# Patient Record
Sex: FEMALE | Race: BLACK OR AFRICAN AMERICAN | Employment: UNEMPLOYED | URBAN - METROPOLITAN AREA
[De-identification: names, ages, dates, MRNs, and addresses within clinical notes are randomized per-mention and may not be internally consistent; named-entity substitution may affect disease eponyms.]

---

## 2021-01-17 ENCOUNTER — HOSPITAL ENCOUNTER (EMERGENCY)
Age: 41
Discharge: HOME | End: 2021-01-17
Attending: STUDENT IN AN ORGANIZED HEALTH CARE EDUCATION/TRAINING PROGRAM
Payer: MEDICAID

## 2021-01-17 VITALS
OXYGEN SATURATION: 98 % | WEIGHT: 205.69 LBS | RESPIRATION RATE: 16 BRPM | HEART RATE: 87 BPM | HEIGHT: 65 IN | TEMPERATURE: 98 F | DIASTOLIC BLOOD PRESSURE: 59 MMHG | SYSTOLIC BLOOD PRESSURE: 129 MMHG | BODY MASS INDEX: 34.27 KG/M2

## 2021-01-17 DIAGNOSIS — R59.1 LYMPHADENOPATHY OF HEAD AND NECK: ICD-10-CM

## 2021-01-17 DIAGNOSIS — H66.002 NON-RECURRENT ACUTE SUPPURATIVE OTITIS MEDIA OF LEFT EAR WITHOUT SPONTANEOUS RUPTURE OF TYMPANIC MEMBRANE: ICD-10-CM

## 2021-01-17 DIAGNOSIS — J01.00 ACUTE NON-RECURRENT MAXILLARY SINUSITIS: Primary | ICD-10-CM

## 2021-01-17 PROCEDURE — 99282 EMERGENCY DEPT VISIT SF MDM: CPT

## 2021-01-17 RX ORDER — AMOXICILLIN AND CLAVULANATE POTASSIUM 875; 125 MG/1; MG/1
1 TABLET, FILM COATED ORAL 2 TIMES DAILY
Qty: 20 TAB | Refills: 0 | Status: SHIPPED | OUTPATIENT
Start: 2021-01-17

## 2021-01-18 NOTE — ED PROVIDER NOTES
EMERGENCY DEPARTMENT HISTORY AND PHYSICAL EXAM      Date: 1/17/2021  Patient Name: Gilford Meth    History of Presenting Illness     Chief Complaint   Patient presents with    Neck Pain     pt feels that she has a swollen lymph node near her left ear that has been bothering her for more than a week. She states that her left neck hurts too. Denies fever       History Provided By: Patient    HPI: Gilford Meth, 45 y.o. female with no significant past medical history, presents to the ED with cc of swelling next to her left ear for 1 week. The patient reports that she had a sinus infection last summer and has had intermittent sinus pressure and pain since then. Over the last week, she has developed an area of painful swelling anterior to her left tragus. She is also noted some mild ear pain and tenderness over the left lateral neck. She is taken Tylenol with minimal relief of pain. Of note, she is currently pregnant in her second trimester. She denies fever, drainage from the ear, pain with eating. There are no other complaints, changes, or physical findings at this time. PCP: Yina Cobb MD    No current facility-administered medications on file prior to encounter. Current Outpatient Medications on File Prior to Encounter   Medication Sig Dispense Refill    [DISCONTINUED] sodium chloride (SALINE NASAL MIST) 0.65 % nasal spray 1 spray by Right Nostril route as needed for Congestion. 15 mL 0    [DISCONTINUED] traMADol (ULTRAM) 50 mg tablet Take 1 tablet by mouth every six (6) hours as needed for Pain. 20 tablet 0    [DISCONTINUED] amoxicillin 500 mg tab Take 500 mg by mouth three (3) times daily. 30 tablet 0    [DISCONTINUED] hydrOXYzine (VISTARIL) 25 mg capsule Take 1 capsule by mouth three (3) times daily as needed for Itching or Anxiety.  12 capsule 0       Past History     Past Medical History:  Past Medical History:   Diagnosis Date    Goiter     Other ill-defined conditions(309.89)     please note that pt states NO MEDICAL HISTORY; charted history was written in error on wrong patient    Urinary tract infection        Past Surgical History:  History reviewed. No pertinent surgical history. Family History:  History reviewed. No pertinent family history. Social History:  Social History     Tobacco Use    Smoking status: Current Every Day Smoker     Packs/day: 0.50    Smokeless tobacco: Never Used   Substance Use Topics    Alcohol use: No    Drug use: No       Allergies: Allergies   Allergen Reactions    Ibuprofen Itching and Swelling    Ibuprofen Swelling         Review of Systems   Review of Systems   Constitutional: Negative for chills and fever. HENT: Positive for congestion, ear pain, sinus pressure and sinus pain. Negative for sore throat. Eyes: Negative for redness and visual disturbance. Respiratory: Negative for cough and shortness of breath. Cardiovascular: Negative for chest pain and palpitations. Gastrointestinal: Negative for abdominal pain, nausea and vomiting. Genitourinary: Negative for dysuria and hematuria. Musculoskeletal: Negative for back pain and gait problem. Skin: Negative for rash and wound. Neurological: Negative for dizziness and headaches. Psychiatric/Behavioral: Negative for behavioral problems and confusion. All other systems reviewed and are negative. Physical Exam   Physical Exam  Vitals signs and nursing note reviewed. Constitutional:       Appearance: She is well-developed. She is not toxic-appearing. Comments: Conversant female in no acute distress. HENT:      Head: Normocephalic and atraumatic. Ears:      Comments: There is a tender, enlarged lymph node anterior to the left tragus. There is no erythema or swelling to the tragus. No enlargement of the parotid gland. Mild erythema of the left TM but no bulging or middle ear effusion.  Mild tenderness to palpation over the left posterior cervical lymph chain, although I am unable to palpate any enlarged lymph nodes there. Nose:      Right Turbinates: Swollen. Left Turbinates: Swollen. Right Sinus: Maxillary sinus tenderness present. No frontal sinus tenderness. Left Sinus: Maxillary sinus tenderness present. No frontal sinus tenderness. Eyes:      Conjunctiva/sclera: Conjunctivae normal.      Pupils: Pupils are equal, round, and reactive to light. Neck:      Musculoskeletal: Normal range of motion and neck supple. Cardiovascular:      Rate and Rhythm: Normal rate and regular rhythm. Heart sounds: Normal heart sounds. Pulmonary:      Effort: Pulmonary effort is normal.      Breath sounds: Normal breath sounds. Abdominal:      Comments: Gravid abdomen. Musculoskeletal: Normal range of motion. Skin:     General: Skin is warm and dry. Findings: No rash. Neurological:      Mental Status: She is alert and oriented to person, place, and time. GCS: GCS eye subscore is 4. GCS verbal subscore is 5. GCS motor subscore is 6. Cranial Nerves: No cranial nerve deficit. Sensory: No sensory deficit. Psychiatric:         Behavior: Behavior normal.           Diagnostic Study Results     Labs -   No results found for this or any previous visit (from the past 12 hour(s)). Radiologic Studies -   No orders to display     CT Results  (Last 48 hours)    None        CXR Results  (Last 48 hours)    None            Medical Decision Making   I am the first provider for this patient. I reviewed the vital signs, available nursing notes, past medical history, past surgical history, family history and social history. Vital Signs-Reviewed the patient's vital signs.   Patient Vitals for the past 12 hrs:   Temp Pulse Resp BP SpO2   01/17/21 1313 98 °F (36.7 °C) 87 16 (!) 129/59 98 %         Records Reviewed: Nursing Notes and Old Medical Records      Provider Notes (Medical Decision Making):   DDx: Lymphadenitis, otitis media, otitis externa, muscle strain, sinusitis    Physical exam reveals evidence of maxillary sinusitis as well as early left otitis media. There is reactive lymphadenopathy. Plan to treat with antibiotic. Discussed follow-up and return precautions. ED Course:   Initial assessment performed. The patients presenting problems have been discussed, and they are in agreement with the care plan formulated and outlined with them. I have encouraged them to ask questions as they arise throughout their visit. Disposition:  2:17 PM  The patient has been re-evaluated and is ready for discharge. Reviewed available results with patient. Counseled patient on diagnosis and care plan. Patient has expressed understanding, and all questions have been answered. Patient agrees with plan and agrees to follow up as recommended, or to return to the ED if their symptoms worsen. Discharge instructions have been provided and explained to the patient, along with reasons to return to the ED. PLAN:  1. Discharge Medication List as of 1/17/2021  2:17 PM        2. Follow-up Information     Follow up With Specialties Details Why Contact Info    Your OB/gyn  Schedule an appointment as soon as possible for a visit in 1 week for a recheck     [facility] EMERGENCY DEPT Emergency Medicine Go to  If symptoms worsen [address] [place] [address]  [phone]        Return to ED if worse     Diagnosis     Clinical Impression:   1. Acute non-recurrent maxillary sinusitis    2. Non-recurrent acute suppurative otitis media of left ear without spontaneous rupture of tympanic membrane    3. Lymphadenopathy of head and neck            Taya Sarkar.  AMAN Rodriguez

## 2024-09-28 ENCOUNTER — HOSPITAL ENCOUNTER (EMERGENCY)
Facility: HOSPITAL | Age: 44
Discharge: HOME | End: 2024-09-28
Attending: EMERGENCY MEDICINE
Payer: MEDICAID

## 2024-09-28 VITALS
SYSTOLIC BLOOD PRESSURE: 121 MMHG | BODY MASS INDEX: 29.99 KG/M2 | HEIGHT: 65 IN | WEIGHT: 180 LBS | DIASTOLIC BLOOD PRESSURE: 73 MMHG | HEART RATE: 87 BPM | TEMPERATURE: 98.1 F | OXYGEN SATURATION: 98 % | RESPIRATION RATE: 18 BRPM

## 2024-09-28 DIAGNOSIS — B96.89 BACTERIAL VAGINOSIS: ICD-10-CM

## 2024-09-28 DIAGNOSIS — N39.0 ACUTE UTI: ICD-10-CM

## 2024-09-28 DIAGNOSIS — Z20.2 ENCOUNTER FOR ASSESSMENT OF STD EXPOSURE: ICD-10-CM

## 2024-09-28 DIAGNOSIS — N76.0 ACUTE VAGINITIS: Primary | ICD-10-CM

## 2024-09-28 DIAGNOSIS — A59.9 TRICHOMONIASIS: ICD-10-CM

## 2024-09-28 DIAGNOSIS — N76.0 BACTERIAL VAGINOSIS: ICD-10-CM

## 2024-09-28 LAB
APPEARANCE UR: ABNORMAL
BACTERIA URNS QL MICRO: ABNORMAL /HPF
BILIRUB UR QL: NEGATIVE
CLUE CELLS VAG QL WET PREP: ABNORMAL
COLOR UR: ABNORMAL
EPITH CASTS URNS QL MICRO: ABNORMAL /LPF
GLUCOSE UR STRIP.AUTO-MCNC: NEGATIVE MG/DL
HCG UR QL: NEGATIVE
HGB UR QL STRIP: NEGATIVE
KETONES UR QL STRIP.AUTO: ABNORMAL MG/DL
LEUKOCYTE ESTERASE UR QL STRIP.AUTO: ABNORMAL
NITRITE UR QL STRIP.AUTO: NEGATIVE
PH UR STRIP: 6.5 (ref 5–8)
PROT UR STRIP-MCNC: ABNORMAL MG/DL
RBC #/AREA URNS HPF: ABNORMAL /HPF (ref 0–5)
SP GR UR REFRACTOMETRY: 1.02
T VAGINALIS VAG QL WET PREP: ABNORMAL
TRICHOMONAS UR QL MICRO: PRESENT
URINE CULTURE IF INDICATED: ABNORMAL
UROBILINOGEN UR QL STRIP.AUTO: 1 EU/DL (ref 0.2–1)
WBC URNS QL MICRO: ABNORMAL /HPF (ref 0–4)
YEAST: ABNORMAL

## 2024-09-28 PROCEDURE — 87086 URINE CULTURE/COLONY COUNT: CPT

## 2024-09-28 PROCEDURE — 81001 URINALYSIS AUTO W/SCOPE: CPT

## 2024-09-28 PROCEDURE — 6360000002 HC RX W HCPCS: Performed by: EMERGENCY MEDICINE

## 2024-09-28 PROCEDURE — 87491 CHLMYD TRACH DNA AMP PROBE: CPT

## 2024-09-28 PROCEDURE — 6370000000 HC RX 637 (ALT 250 FOR IP): Performed by: EMERGENCY MEDICINE

## 2024-09-28 PROCEDURE — 87591 N.GONORRHOEAE DNA AMP PROB: CPT

## 2024-09-28 PROCEDURE — 87210 SMEAR WET MOUNT SALINE/INK: CPT

## 2024-09-28 PROCEDURE — 2500000003 HC RX 250 WO HCPCS: Performed by: EMERGENCY MEDICINE

## 2024-09-28 PROCEDURE — 96372 THER/PROPH/DIAG INJ SC/IM: CPT

## 2024-09-28 PROCEDURE — 81025 URINE PREGNANCY TEST: CPT

## 2024-09-28 PROCEDURE — 99284 EMERGENCY DEPT VISIT MOD MDM: CPT

## 2024-09-28 RX ORDER — AZITHROMYCIN 500 MG/1
1000 TABLET, FILM COATED ORAL
Status: COMPLETED | OUTPATIENT
Start: 2024-09-28 | End: 2024-09-28

## 2024-09-28 RX ORDER — METRONIDAZOLE 500 MG/1
2000 TABLET ORAL
Status: COMPLETED | OUTPATIENT
Start: 2024-09-28 | End: 2024-09-28

## 2024-09-28 RX ORDER — METRONIDAZOLE 500 MG/1
500 TABLET ORAL 2 TIMES DAILY
Qty: 14 TABLET | Refills: 0 | Status: SHIPPED | OUTPATIENT
Start: 2024-09-28 | End: 2024-10-05

## 2024-09-28 RX ORDER — CEPHALEXIN 500 MG/1
500 CAPSULE ORAL 3 TIMES DAILY
Qty: 21 CAPSULE | Refills: 0 | Status: SHIPPED | OUTPATIENT
Start: 2024-09-28 | End: 2024-10-05

## 2024-09-28 RX ADMIN — LIDOCAINE HYDROCHLORIDE 500 MG: 10 INJECTION, SOLUTION EPIDURAL; INFILTRATION; INTRACAUDAL; PERINEURAL at 01:24

## 2024-09-28 RX ADMIN — METRONIDAZOLE 2000 MG: 500 TABLET ORAL at 01:34

## 2024-09-28 RX ADMIN — AZITHROMYCIN 1000 MG: 500 TABLET, FILM COATED ORAL at 01:24

## 2024-09-28 ASSESSMENT — ENCOUNTER SYMPTOMS
RHINORRHEA: 0
SHORTNESS OF BREATH: 0
SORE THROAT: 0
ABDOMINAL PAIN: 0
DIARRHEA: 0
EYE PAIN: 0
VOMITING: 0
COUGH: 0
NAUSEA: 0

## 2024-09-28 ASSESSMENT — PAIN SCALES - GENERAL: PAINLEVEL_OUTOF10: 0

## 2024-09-28 NOTE — ED NOTES
Discharge instructions were given to the patient by Barbara DE PAZ.     The patient left the Emergency Department alert and oriented and in no acute distress with 2 prescriptions. The patient was encouraged to call or return to the ED for worsening issues or problems and was encouraged to schedule a follow up appointment for continuing care.     Ambulation assessment completed before discharge.  Pt left Emergency Department ambulating at baseline with no ortho devices  Ortho device education: none    The patient verbalized understanding of discharge instructions and prescriptions, all questions were answered. The patient has no further concerns at this time.

## 2024-09-28 NOTE — ED PROVIDER NOTES
EMERGENCY DEPARTMENT HISTORY AND PHYSICAL EXAM            Please note that this dictation was completed with the assistance of \"Dragon\", the computer voice recognition software. Quite often unanticipated grammatical, syntax, homophones, and other interpretive errors are inadvertently transcribed by the computer software. Please disregard these errors and any errors that have escaped final proofreading. Thank you.    Date of Evaluation: 09/28/24  Patient: Michelle Sims  Patient Age and Sex: 44 y.o. female   MRN: [id number]  CSN: [id number]  PCP: Fior uGtierrez MD    History of Present Illness     Chief Complaint   Patient presents with    Vaginal Discharge     History Provided By: Patient/family/EMS (if available)    History is limited by: Nothing     HPI: Michelle Sims, 44 y.o. female with past medical history as documented below presents to the ED with c/o of 2-day history of mild to moderate vaginal discharge.  Patient reports concern for STI.  Denies any pelvic pain, no nausea or vomiting.  Denies any medications prior to arrival.. Pt denies any other exacerbating or ameliorating factors. There are no other complaints, changes or physical findings pertinent to the HPI at this time.    Nursing notes were all reviewed and agreed with or any disagreements were addressed in the HPI.    Past History   Past Medical History:  Past Medical History:   Diagnosis Date    Goiter     Other ill-defined conditions(015.80)     please note that pt states NO MEDICAL HISTORY; charted history was written in error on wrong patient    Urinary tract infection        Past Surgical History:  History reviewed. No pertinent surgical history.    Family History:   Family history reviewed and was non-contributory, unless specified below:  History reviewed. No pertinent family history.    Social History:  Social History     Tobacco Use    Smoking status: Every Day     Current packs/day: 0.50     Types: Cigarettes

## 2024-09-28 NOTE — ED TRIAGE NOTES
Triage: Pt arrives ambulatory from home with CC of vaginal discharge. She reports the discharge is white. She reports concern for either STD or BV.

## 2024-09-29 LAB
BACTERIA SPEC CULT: NORMAL
SERVICE CMNT-IMP: NORMAL

## 2024-09-30 LAB
C TRACH DNA SPEC QL NAA+PROBE: NEGATIVE
N GONORRHOEA DNA SPEC QL NAA+PROBE: NEGATIVE
SAMPLE TYPE: NORMAL
SERVICE CMNT-IMP: NORMAL
SPECIMEN SOURCE: NORMAL

## 2024-12-13 ENCOUNTER — HOSPITAL ENCOUNTER (EMERGENCY)
Facility: HOSPITAL | Age: 44
Discharge: HOME | End: 2024-12-13
Attending: STUDENT IN AN ORGANIZED HEALTH CARE EDUCATION/TRAINING PROGRAM
Payer: MEDICAID

## 2024-12-13 VITALS
TEMPERATURE: 97.3 F | BODY MASS INDEX: 30.24 KG/M2 | WEIGHT: 181.5 LBS | HEIGHT: 65 IN | HEART RATE: 84 BPM | SYSTOLIC BLOOD PRESSURE: 116 MMHG | DIASTOLIC BLOOD PRESSURE: 57 MMHG | OXYGEN SATURATION: 100 % | RESPIRATION RATE: 16 BRPM

## 2024-12-13 DIAGNOSIS — A59.9 TRICHOMONAS INFECTION: Primary | ICD-10-CM

## 2024-12-13 DIAGNOSIS — Z20.2 EXPOSURE TO STD: ICD-10-CM

## 2024-12-13 LAB
APPEARANCE UR: ABNORMAL
BACTERIA URNS QL MICRO: NEGATIVE /HPF
BILIRUB UR QL: NEGATIVE
C TRACH DNA SPEC QL NAA+PROBE: NEGATIVE
CLUE CELLS VAG QL WET PREP: ABNORMAL
COLOR UR: ABNORMAL
EPITH CASTS URNS QL MICRO: ABNORMAL /LPF
GLUCOSE UR STRIP.AUTO-MCNC: NEGATIVE MG/DL
HCG UR QL: NEGATIVE
HGB UR QL STRIP: NEGATIVE
KETONES UR QL STRIP.AUTO: NEGATIVE MG/DL
LEUKOCYTE ESTERASE UR QL STRIP.AUTO: ABNORMAL
N GONORRHOEA DNA SPEC QL NAA+PROBE: NEGATIVE
NITRITE UR QL STRIP.AUTO: NEGATIVE
PH UR STRIP: 5.5 (ref 5–8)
PROT UR STRIP-MCNC: ABNORMAL MG/DL
RBC #/AREA URNS HPF: ABNORMAL /HPF (ref 0–5)
SAMPLE TYPE: NORMAL
SERVICE CMNT-IMP: NORMAL
SP GR UR REFRACTOMETRY: 1.02
SPECIMEN SOURCE: NORMAL
T VAGINALIS VAG QL WET PREP: ABNORMAL
URINE CULTURE IF INDICATED: ABNORMAL
UROBILINOGEN UR QL STRIP.AUTO: 1 EU/DL (ref 0.2–1)
WBC URNS QL MICRO: ABNORMAL /HPF (ref 0–4)
YEAST: ABNORMAL

## 2024-12-13 PROCEDURE — 87086 URINE CULTURE/COLONY COUNT: CPT

## 2024-12-13 PROCEDURE — 81025 URINE PREGNANCY TEST: CPT

## 2024-12-13 PROCEDURE — 87491 CHLMYD TRACH DNA AMP PROBE: CPT

## 2024-12-13 PROCEDURE — 96372 THER/PROPH/DIAG INJ SC/IM: CPT

## 2024-12-13 PROCEDURE — 87210 SMEAR WET MOUNT SALINE/INK: CPT

## 2024-12-13 PROCEDURE — 6370000000 HC RX 637 (ALT 250 FOR IP): Performed by: STUDENT IN AN ORGANIZED HEALTH CARE EDUCATION/TRAINING PROGRAM

## 2024-12-13 PROCEDURE — 81001 URINALYSIS AUTO W/SCOPE: CPT

## 2024-12-13 PROCEDURE — 6360000002 HC RX W HCPCS: Performed by: STUDENT IN AN ORGANIZED HEALTH CARE EDUCATION/TRAINING PROGRAM

## 2024-12-13 PROCEDURE — 99284 EMERGENCY DEPT VISIT MOD MDM: CPT

## 2024-12-13 PROCEDURE — 87591 N.GONORRHOEAE DNA AMP PROB: CPT

## 2024-12-13 RX ORDER — AZITHROMYCIN 500 MG/1
1000 TABLET, FILM COATED ORAL
Status: COMPLETED | OUTPATIENT
Start: 2024-12-13 | End: 2024-12-13

## 2024-12-13 RX ORDER — FLUCONAZOLE 150 MG/1
150 TABLET ORAL ONCE
Qty: 2 TABLET | Refills: 0 | Status: SHIPPED | OUTPATIENT
Start: 2024-12-13 | End: 2024-12-13

## 2024-12-13 RX ORDER — METRONIDAZOLE 500 MG/1
500 TABLET ORAL 2 TIMES DAILY
Qty: 14 TABLET | Refills: 0 | Status: SHIPPED | OUTPATIENT
Start: 2024-12-13 | End: 2024-12-20

## 2024-12-13 RX ADMIN — LIDOCAINE HYDROCHLORIDE 500 MG: 10 INJECTION, SOLUTION EPIDURAL; INFILTRATION; INTRACAUDAL; PERINEURAL at 09:27

## 2024-12-13 RX ADMIN — AZITHROMYCIN 1000 MG: 500 TABLET, FILM COATED ORAL at 09:26

## 2024-12-13 ASSESSMENT — PAIN - FUNCTIONAL ASSESSMENT: PAIN_FUNCTIONAL_ASSESSMENT: NONE - DENIES PAIN

## 2024-12-13 NOTE — ED PROVIDER NOTES
[facility] EMERGENCY DEPT  EMERGENCY DEPARTMENT ENCOUNTER       Pt Name: Michelle Sims  MRN: [id number]  Birthdate 1980  Date of evaluation: 12/13/2024  Provider: Rizwana Nielsen DO   PCP: Fior Gutierrez MD  Note Started: 9:42 AM 12/13/24     CHIEF COMPLAINT       Chief Complaint   Patient presents with    Vaginal Itching        HISTORY OF PRESENT ILLNESS: 1 or more elements      History From: Patient  None     Michelle Sims is a 44 y.o. female who presents with cc of continued vaginal itching.  She reports she was seen here in September, treated for trichomonas but her symptoms have continued.  Denies any abdominal pain dysuria.  Patient did tell one of her sexual partners to get treated but endorses she may have told the wrong partner and may have been reinfected.     Nursing Notes were all reviewed and agreed with or any disagreements were addressed in the HPI.       PAST HISTORY     Past Medical History:  Past Medical History:   Diagnosis Date    Goiter     Other ill-defined conditions(261.02)     please note that pt states NO MEDICAL HISTORY; charted history was written in error on wrong patient    Urinary tract infection          Past Surgical History:  No past surgical history on file.    Family History:  No family history on file.    Social History:  Social History     Tobacco Use    Smoking status: Every Day     Current packs/day: 0.50     Types: Cigarettes    Smokeless tobacco: Never   Substance Use Topics    Alcohol use: No    Drug use: No       Allergies:  Allergies   Allergen Reactions    Ibuprofen Other (See Comments)     Tongue itching       CURRENT MEDICATIONS      Discharge Medication List as of 12/13/2024  9:07 AM            PHYSICAL EXAM      ED Triage Vitals   Encounter Vitals Group      BP 12/13/24 0820 (!) 116/57      Systolic BP Percentile --       Diastolic BP Percentile --       Pulse 12/13/24 0820 84      Respirations 12/13/24 0820 16      Temp 12/13/24 0820 97.3 °F  software. Please disregards these errors. Please excuse any errors that have escaped final proofreading.)          Rizwana Nielsen DO  12/13/24 0945

## 2024-12-13 NOTE — ED NOTES
Discharge instructions were given to the patient by Luci Wilcox RN  .     The patient left the Emergency Department ambulatory, alert and oriented and in no acute distress with 2 prescriptions. The patient was encouraged to call or return to the ED for worsening issues or problems and was encouraged to schedule a follow up appointment for continuing care. Patient discharged home ambulatory with a steady gait.    The patient verbalized understanding of discharge instructions and prescriptions, all questions were answered. The patient has no further concerns at this time.

## 2024-12-13 NOTE — ED NOTES
Pt presents ambulatory to ED complaining of vaginal irritation, dysuria and discharge. Pt reports being dx with \"trichomoniasis, BV and UTI\". She reports taking the antibiotics as prescribed but the symptoms did not resolve per pt. Pt is alert and oriented x 4, RR even and unlabored, skin is warm and dry. Assessment completed and pt updated on plan of care.        Emergency Department Nursing Plan of Care        The Nursing Plan of Care is developed from the Nursing assessment and Emergency Department Attending provider initial evaluation.  The plan of care may be reviewed in the “ED Provider note”.

## 2024-12-14 LAB
BACTERIA SPEC CULT: NORMAL
SERVICE CMNT-IMP: NORMAL

## 2025-02-02 ENCOUNTER — HOSPITAL ENCOUNTER (EMERGENCY)
Facility: HOSPITAL | Age: 45
Discharge: HOME | End: 2025-02-02
Payer: MEDICAID

## 2025-02-02 VITALS
TEMPERATURE: 96.6 F | DIASTOLIC BLOOD PRESSURE: 56 MMHG | WEIGHT: 180 LBS | RESPIRATION RATE: 16 BRPM | SYSTOLIC BLOOD PRESSURE: 126 MMHG | HEIGHT: 65 IN | OXYGEN SATURATION: 99 % | HEART RATE: 85 BPM | BODY MASS INDEX: 29.99 KG/M2

## 2025-02-02 DIAGNOSIS — Z20.2 EXPOSURE TO SEXUALLY TRANSMITTED DISEASE (STD): Primary | ICD-10-CM

## 2025-02-02 DIAGNOSIS — R59.9 SWELLING OF LYMPH NODE: ICD-10-CM

## 2025-02-02 LAB
APPEARANCE UR: ABNORMAL
BACTERIA URNS QL MICRO: ABNORMAL /HPF
BILIRUB UR QL: NEGATIVE
CLUE CELLS VAG QL WET PREP: NORMAL
COLOR UR: ABNORMAL
EPITH CASTS URNS QL MICRO: ABNORMAL /LPF
GLUCOSE UR STRIP.AUTO-MCNC: NEGATIVE MG/DL
HGB UR QL STRIP: NEGATIVE
KETONES UR QL STRIP.AUTO: ABNORMAL MG/DL
LEUKOCYTE ESTERASE UR QL STRIP.AUTO: ABNORMAL
NITRITE UR QL STRIP.AUTO: NEGATIVE
PH UR STRIP: 6.5 (ref 5–8)
PROT UR STRIP-MCNC: ABNORMAL MG/DL
RBC #/AREA URNS HPF: ABNORMAL /HPF (ref 0–5)
SP GR UR REFRACTOMETRY: 1.02
T VAGINALIS VAG QL WET PREP: NORMAL
URINE CULTURE IF INDICATED: ABNORMAL
UROBILINOGEN UR QL STRIP.AUTO: 1 EU/DL (ref 0.2–1)
WBC URNS QL MICRO: ABNORMAL /HPF (ref 0–4)
YEAST WET PREP: NORMAL

## 2025-02-02 PROCEDURE — 87491 CHLMYD TRACH DNA AMP PROBE: CPT

## 2025-02-02 PROCEDURE — 87210 SMEAR WET MOUNT SALINE/INK: CPT

## 2025-02-02 PROCEDURE — 87591 N.GONORRHOEAE DNA AMP PROB: CPT

## 2025-02-02 PROCEDURE — 81001 URINALYSIS AUTO W/SCOPE: CPT

## 2025-02-02 PROCEDURE — 99283 EMERGENCY DEPT VISIT LOW MDM: CPT

## 2025-02-02 ASSESSMENT — PAIN - FUNCTIONAL ASSESSMENT: PAIN_FUNCTIONAL_ASSESSMENT: NONE - DENIES PAIN

## 2025-02-02 NOTE — ED PROVIDER NOTES
[facility] EMERGENCY DEPARTMENT  EMERGENCY DEPARTMENT ENCOUNTER         Pt Name: Michelle Sims  MRN: [id number]  Birthdate 1980  Date of evaluation: 2/2/2025  Provider: RUBIA Parrish   PCP: Fior Gutierrez MD  Note Started: 5:34 PM EST 2/2/25     CHIEF COMPLAINT       Chief Complaint   Patient presents with    Exposure to STD     Per pt her partner has Chlamydia          HISTORY OF PRESENT ILLNESS: 1 or more elements      History From: Patient  HPI Limitations: None     Michelle Sims is a 44 y.o. female who presents with 2 separate complaints.  Her first complaint is that she was exposed to chlamydia about a week ago with vaginal and oral sex.  However denies any vaginal discharge, dysuria, vaginal bleeding, sore throat or rash.     Her second complaint is that she has noticed a swollen external left ear for at least several weeks.  Denies any congestion, ear pain, otorrhea, eye pain or discharge.      Nursing Notes were all reviewed and agreed with or any disagreements were addressed in the HPI.  Please see MDM for additional details of HPI and ROS     REVIEW OF SYSTEMS      Review of Systems     Positives and Pertinent negatives as per HPI.    PAST HISTORY     Past Medical History:  Past Medical History:   Diagnosis Date    Goiter     Other ill-defined conditions(299.19)     please note that pt states NO MEDICAL HISTORY; charted history was written in error on wrong patient    Urinary tract infection        Past Surgical History:  History reviewed. No pertinent surgical history.    Family History:  History reviewed. No pertinent family history.    Social History:  Social History     Tobacco Use    Smoking status: Every Day     Current packs/day: 0.50     Types: Cigarettes    Smokeless tobacco: Never   Substance Use Topics    Alcohol use: No    Drug use: No       Allergies:  Allergies   Allergen Reactions    Ibuprofen Other (See Comments)     Tongue itching       CURRENT

## 2025-02-02 NOTE — ED NOTES
Pt presents ambulatory to the ED for STD check. Pt was exposed to chlamydia. Pt experiencing no sx's at this time.     Emergency Department Nursing Plan of Care       The Nursing Plan of Care is developed from the Nursing assessment and Emergency Department Attending provider initial evaluation.  The plan of care may be reviewed in the “ED Provider note”.      The Plan of Care was developed with the following considerations:  Patient / Family readiness to learn indicated by:verbalized understanding  Persons(s) to be included in education: patient  Barriers to Learning/Limitations:None      Signed     LINDSEY SHETH RN    2/2/2025   4:28 PM

## 2025-02-02 NOTE — DISCHARGE INSTRUCTIONS
Please make sure we have your correct number.   1) If your gonorrhea or chlamydia tests are positive and you have not been treated, we will give you a call. If you have been treated in the Emergency department, you will receive a letter with the results.    2) You need to follow up for further STD testing such as HIV, Hepatitis C and syphilis as there is a chance you could have contracted this and we do not test for this in ED. Follow up with the health department for further testing (see locations below).    3) Condoms reduce the risk of catching a sexual related disease.  Please make sure to have protected sex at all times.     For any future STD testing, please go to any of these locations for testing and treatment:    [facility]  [address] [place] [address]  [phone] [facility]  [address] [place] [address]  [phone]   [facility]  [address] [place] [address]  [phone] [facility]  [address] [place] [address]  [phone]   [facility]  [address] [place] [address]  [phone] [facility]  [address] [place] [address]  [phone]   [facility]  [address] [place] [address]  [phone] [facility]  [address] [place] [address]  [phone]

## 2025-07-13 ENCOUNTER — HOSPITAL ENCOUNTER (EMERGENCY)
Facility: HOSPITAL | Age: 45
Discharge: HOME | End: 2025-07-13
Payer: MEDICAID

## 2025-07-13 VITALS
WEIGHT: 200 LBS | RESPIRATION RATE: 18 BRPM | SYSTOLIC BLOOD PRESSURE: 115 MMHG | DIASTOLIC BLOOD PRESSURE: 85 MMHG | OXYGEN SATURATION: 100 % | BODY MASS INDEX: 33.32 KG/M2 | TEMPERATURE: 97.7 F | HEIGHT: 65 IN | HEART RATE: 88 BPM

## 2025-07-13 DIAGNOSIS — R51.9 NONINTRACTABLE EPISODIC HEADACHE, UNSPECIFIED HEADACHE TYPE: Primary | ICD-10-CM

## 2025-07-13 DIAGNOSIS — R59.0 PREAURICULAR LYMPHADENOPATHY: ICD-10-CM

## 2025-07-13 PROCEDURE — 99282 EMERGENCY DEPT VISIT SF MDM: CPT

## 2025-07-13 ASSESSMENT — PAIN - FUNCTIONAL ASSESSMENT: PAIN_FUNCTIONAL_ASSESSMENT: 0-10

## 2025-07-13 ASSESSMENT — PAIN DESCRIPTION - DESCRIPTORS: DESCRIPTORS: ACHING

## 2025-07-13 ASSESSMENT — PAIN DESCRIPTION - LOCATION: LOCATION: HEAD

## 2025-07-13 ASSESSMENT — PAIN SCALES - GENERAL: PAINLEVEL_OUTOF10: 6

## 2025-07-13 NOTE — ED NOTES
Discharge instructions were given to the patient by BALDEV Barber.     The patient left the Emergency Department alert and oriented and in no acute distress with 0 prescriptions. The patient was encouraged to call or return to the ED for worsening issues or problems and was encouraged to schedule a follow up appointment for continuing care.     Ambulation assessment completed before discharge.  Pt left Emergency Department at expected ambulatory status with no ortho devices  Ortho device education: none    The patient verbalized understanding of discharge instructions and prescriptions, all questions were answered. The patient has no further concerns at this time.

## 2025-07-13 NOTE — ED PROVIDER NOTES
[facility] EMERGENCY DEPARTMENT  EMERGENCY DEPARTMENT ENCOUNTER       Pt Name: Michelle Sims  MRN: [id number]  Birthdate 1980  Date of evaluation: 7/13/2025  Provider: RUBIA Soni   PCP: Fior Gutierrez MD  Note Started: 5:30 PM EDT 7/13/25     CHIEF COMPLAINT       Chief Complaint   Patient presents with    Headache     Patient presents to ED with c/o intermittent headache for a few months related to mold exposure        HISTORY OF PRESENT ILLNESS: 1 or more elements      History From: Patient  HPI Limitations: None  Arrival mode:      Michelle Sims is a 44 y.o. female who presents with CC of intermittent headache that has been ongoing for several months.  The patient recently found mold in her home and is concerned that this is the cause.  Headache is generalized and seems to come on when she is in the home and resolve when she leaves.  She does not have a headache at present.  Nothing in particular makes it better or worse.  Denies changes in vision, numbness, weakness, syncope, fever, chills.    She also complains about a lymph node anterior to the left ear that has been present for 3 months despite a course of antibiotics.  The lymph node does not seem to be growing in size.  It is nontender.  Denies otalgia, sore throat, congestion. Her 2 children are also being evaluated for suspected mold exposure complaints.     Nursing Notes were all reviewed and agreed with or any disagreements were addressed in the HPI.   Please see MDM for additional details of HPI and ROS  REVIEW OF SYSTEMS      Review of Systems   Constitutional:  Negative for fever.   Eyes:  Negative for visual disturbance.   Neurological:  Positive for headaches. Negative for tremors, seizures, syncope, facial asymmetry, speech difficulty, weakness, light-headedness and numbness.        Positives and Pertinent negatives as per HPI.    PAST HISTORY     Past Medical History:  Past Medical History: